# Patient Record
Sex: FEMALE | Race: WHITE | NOT HISPANIC OR LATINO | Employment: FULL TIME | URBAN - METROPOLITAN AREA
[De-identification: names, ages, dates, MRNs, and addresses within clinical notes are randomized per-mention and may not be internally consistent; named-entity substitution may affect disease eponyms.]

---

## 2024-10-25 ENCOUNTER — APPOINTMENT (OUTPATIENT)
Dept: RADIOLOGY | Facility: HOSPITAL | Age: 39
End: 2024-10-25
Payer: COMMERCIAL

## 2024-10-25 ENCOUNTER — HOSPITAL ENCOUNTER (EMERGENCY)
Facility: HOSPITAL | Age: 39
Discharge: HOME | End: 2024-10-25
Payer: COMMERCIAL

## 2024-10-25 VITALS
SYSTOLIC BLOOD PRESSURE: 137 MMHG | BODY MASS INDEX: 29.23 KG/M2 | HEIGHT: 63 IN | RESPIRATION RATE: 18 BRPM | WEIGHT: 165 LBS | DIASTOLIC BLOOD PRESSURE: 88 MMHG | OXYGEN SATURATION: 98 % | HEART RATE: 82 BPM | TEMPERATURE: 97.5 F

## 2024-10-25 DIAGNOSIS — S69.91XA INJURY OF RIGHT WRIST, INITIAL ENCOUNTER: Primary | ICD-10-CM

## 2024-10-25 PROCEDURE — 73130 X-RAY EXAM OF HAND: CPT | Mod: RIGHT SIDE | Performed by: RADIOLOGY

## 2024-10-25 PROCEDURE — 99284 EMERGENCY DEPT VISIT MOD MDM: CPT

## 2024-10-25 PROCEDURE — 73110 X-RAY EXAM OF WRIST: CPT | Mod: RIGHT SIDE | Performed by: RADIOLOGY

## 2024-10-25 PROCEDURE — 73110 X-RAY EXAM OF WRIST: CPT | Mod: RT

## 2024-10-25 PROCEDURE — 73130 X-RAY EXAM OF HAND: CPT | Mod: RT

## 2024-10-25 RX ORDER — NAPROXEN 500 MG/1
500 TABLET ORAL 2 TIMES DAILY PRN
Qty: 14 TABLET | Refills: 0 | Status: SHIPPED | OUTPATIENT
Start: 2024-10-25 | End: 2024-11-01

## 2024-10-25 ASSESSMENT — PAIN DESCRIPTION - LOCATION: LOCATION: WRIST

## 2024-10-25 ASSESSMENT — COLUMBIA-SUICIDE SEVERITY RATING SCALE - C-SSRS
6. HAVE YOU EVER DONE ANYTHING, STARTED TO DO ANYTHING, OR PREPARED TO DO ANYTHING TO END YOUR LIFE?: NO
2. HAVE YOU ACTUALLY HAD ANY THOUGHTS OF KILLING YOURSELF?: NO
1. IN THE PAST MONTH, HAVE YOU WISHED YOU WERE DEAD OR WISHED YOU COULD GO TO SLEEP AND NOT WAKE UP?: NO

## 2024-10-25 ASSESSMENT — PAIN SCALES - GENERAL: PAINLEVEL_OUTOF10: 5 - MODERATE PAIN

## 2024-10-25 ASSESSMENT — PAIN DESCRIPTION - ORIENTATION: ORIENTATION: RIGHT

## 2024-10-25 ASSESSMENT — PAIN DESCRIPTION - DESCRIPTORS: DESCRIPTORS: ACHING

## 2024-10-25 ASSESSMENT — PAIN - FUNCTIONAL ASSESSMENT: PAIN_FUNCTIONAL_ASSESSMENT: 0-10

## 2024-10-25 NOTE — ED PROVIDER NOTES
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  Pt re injured yesterday with another aggressive student.         39-year-old female with past history of anxiety, depression, narcolepsy presents to the emergency department today for right hand and wrist pain.  She is right-hand dominant.  States on Monday while at work she was putting a student in a hold and jammed her wrist at that time.  She was icing it and taking ibuprofen with some improvement in the pain.  States yesterday she had to take care of another aggressive student and placed him in a hold and jammed it again has been having discomfort since that time.  There is no numbness or tingling extremity.  Did take some ibuprofen this morning around 10 AM and has been icing it.  Denies any further pain or injury.                          Angel Coma Scale Score: 15                  Patient History   Past Medical History:   Diagnosis Date    Anxiety     Depression     Narcolepsy      History reviewed. No pertinent surgical history.  No family history on file.  Social History     Tobacco Use    Smoking status: Never    Smokeless tobacco: Never   Substance Use Topics    Alcohol use: Yes     Comment: Occaisonal    Drug use: Never       Physical Exam   ED Triage Vitals [10/25/24 1532]   Temperature Heart Rate Respirations BP   36.4 °C (97.5 °F) 82 18 137/88      Pulse Ox Temp Source Heart Rate Source Patient Position   98 % Temporal -- --      BP Location FiO2 (%)     -- --       Physical Exam  Vitals reviewed.   Constitutional:       Appearance: Normal appearance.   HENT:      Head: Normocephalic.   Cardiovascular:      Rate and Rhythm: Normal rate and regular rhythm.   Pulmonary:      Effort: Pulmonary effort is normal.      Breath sounds: Normal breath sounds.   Abdominal:      General: Abdomen is flat.      Palpations: Abdomen is soft.   Musculoskeletal:      Right shoulder:  Normal.      Right elbow: Normal.      Right wrist: Bony tenderness present. No snuff box tenderness. Normal range of motion. Normal pulse.      Right hand: Tenderness and bony tenderness present. Normal range of motion. Normal strength. Normal sensation. Normal capillary refill. Normal pulse.      Comments: Tenderness on the ulnar aspect of the wrist and fifth metacarpal region   Skin:     General: Skin is warm and dry.      Capillary Refill: Capillary refill takes less than 2 seconds.   Neurological:      Mental Status: She is alert.         Labs Reviewed - No data to display  Pain Management Panel           No data to display              XR wrist right 3+ views   Final Result   Normal radiographs of the right hand and wrist. 5th metacarpal   grossly unremarkable.        Signed by: Justice Gee 10/25/2024 5:23 PM   Dictation workstation:   [id number]      XR hand right 3+ views   Final Result   Normal radiographs of the right hand and wrist. 5th metacarpal   grossly unremarkable.        Signed by: Justice Gee 10/25/2024 5:23 PM   Dictation workstation:   [id number]          ED Course & MDM   Diagnoses as of 10/25/24 1746   Injury of right wrist, initial encounter       Medical Decision Making  On initial evaluation patient is well-appearing in the emergency department at this time.  She was given an ice pack in the ED.  Declined wanting anything for her pain.  X-ray of the wrist and hand were obtained which show normal radiographs of the right hand and wrist fifth metacarpal grossly unremarkable.  Discussed results in depth the patient.  Educated on return precautions and outpatient follow-up with orthopedics.  It is a naproxen to her pharmacy.  She verbalized understanding agree with plan has no further questions or concerns should be discharged home in stable condition.        Procedure  Procedures          I discussed the differential, results and discharge plan with the patient and/or  family/friend/caregiver if present.  I emphasized the importance of follow-up with the physician I referred them to in the timeframe recommended.  I explained reasons for the patient to return to the Emergency Department. Additional verbal discharge instructions were also given and discussed with the patient to supplement those generated by the EMR. We also discussed medications that were prescribed (if any) including common side effects and interactions. The patient was advised to abstain from driving, operating heavy machinery or making significant decisions while taking medications such as opiates and muscle relaxers that may impair this. All questions were addressed.  They understand return precautions and discharge instructions. The patient and/or family/friend/caregiver expressed understanding.           Becky Rizo, JUNE-CNP  10/25/24 8135